# Patient Record
Sex: FEMALE | Race: BLACK OR AFRICAN AMERICAN | NOT HISPANIC OR LATINO | URBAN - METROPOLITAN AREA
[De-identification: names, ages, dates, MRNs, and addresses within clinical notes are randomized per-mention and may not be internally consistent; named-entity substitution may affect disease eponyms.]

---

## 2024-04-04 ENCOUNTER — EMERGENCY (EMERGENCY)
Age: 34
LOS: 1 days | Discharge: HOME | End: 2024-04-04
Admitting: EMERGENCY MEDICINE
Payer: MEDICAID

## 2024-04-04 VITALS
SYSTOLIC BLOOD PRESSURE: 153 MMHG | OXYGEN SATURATION: 99 % | HEART RATE: 73 BPM | DIASTOLIC BLOOD PRESSURE: 90 MMHG | RESPIRATION RATE: 16 BRPM | TEMPERATURE: 98 F

## 2024-04-04 VITALS
RESPIRATION RATE: 16 BRPM | HEART RATE: 84 BPM | WEIGHT: 139.99 LBS | DIASTOLIC BLOOD PRESSURE: 78 MMHG | OXYGEN SATURATION: 97 % | SYSTOLIC BLOOD PRESSURE: 133 MMHG | TEMPERATURE: 99 F

## 2024-04-04 PROCEDURE — 99284 EMERGENCY DEPT VISIT MOD MDM: CPT | Mod: 25

## 2024-04-04 PROCEDURE — 56420 I&D BARTHOLINS GLAND ABSCESS: CPT

## 2024-04-04 NOTE — ED STATDOCS - RAPID ASSESSMENT
32y/o F with pmhx of iron def anemia presents for 2 vaginal abscesses. No fevers, not currently pregnant per patient. I have performed a medical screening examination on this patient, no cardiac or respiratory findings. no acute distress. Will transfer to adult ED. Report given to Dr. Kapoor.   -Eliot Du PA-C

## 2024-04-04 NOTE — ED PROVIDER NOTE - NSFOLLOWUPINSTRUCTIONS_ED_ALL_ED_FT
Follow up with OB/GYN clinic in 1-2 weeks.  [phone]  Fax: [phone] [place] [address], [place] [address]    Return to Emergency room with any worsening symptoms or no improvements.

## 2024-04-04 NOTE — ED ADULT NURSE NOTE - NSFALLUNIVINTERV_ED_ALL_ED
Bed/Stretcher in lowest position, wheels locked, appropriate side rails in place/Call bell, personal items and telephone in reach/Instruct patient to call for assistance before getting out of bed/chair/stretcher/Non-slip footwear applied when patient is off stretcher/[place] to call system/Physically safe environment - no spills, clutter or unnecessary equipment/Purposeful proactive rounding/Room/bathroom lighting operational, light cord in reach

## 2024-04-04 NOTE — ED PROVIDER NOTE - PHYSICAL EXAMINATION
Exam chaperoned by Nurse sandy  there is bartholin abscess  around 3cm in right with fluctuance and tenderness.

## 2024-04-04 NOTE — ED PROVIDER NOTE - PRINCIPAL DIAGNOSIS
[facility] notified ETA 30-40min
Maintained one on one monitoring for pt. Remained coherent, when assessed for suicidal threats admitted to have active thoughts of hurting self.  Room been secured for safety issues
Parent (mother) @ bedside, claimed that pt needs her night meds for anti rejection for her transplant & anti anxiety. Dr Bernarda Lozoya informed will give prescribed meds. Prograf will be given from pt's stocks.
Patient moved at 2200 to locked seclusion room for her safety as she tried to strangle herself with a blood pressure cuff.
Pt lying on her stomach flat on stretcher in dark room with PCT in doorway. Pt awakens easily is calm and cooperative. Refuses breakfast at this time.
Pt was noted to have BP cord around her neck per Dr Dasia Lowe & was breathing but verbally non responsive.  Took cord out of pt neck & pt woke up right after per Dr Dasia Lowe
Report given to Xiam. Pt remained on one on one.
Report to [facility] @ [facility] [place] ext [phone]. Jemma Jamaal had questions regarding pt insulin pump , vitals and accucheck . Will return call with appropriate info.
Bartholin's gland abscess

## 2024-04-04 NOTE — ED PROVIDER NOTE - OBJECTIVE STATEMENT
32 yo female with no pmhx presents to the ED with vaginal abscess x1.5 weeks. pt states it started after she shaved the first time with a razor. she states it started at a small abscess and started to grow larger. she states it painful to walk due to the abscess. denies urinary sx, vag dc.   pt states she wishes to get a general STI testing.

## 2024-04-04 NOTE — ED ADULT TRIAGE NOTE - CHIEF COMPLAINT QUOTE
Pt endorsing abscess in the outer vaginal region for over a week. Pt states part of the abscess has drained, however it is still very big and causing blockage. Denying GI/ changes. Past medical history of iron deficiency anemia.

## 2024-04-04 NOTE — ED PROVIDER NOTE - PATIENT PORTAL LINK FT
You can access the FollowMyHealth Patient Portal offered by [facility] by registering at the following website: [email or web address]. By joining [facility]’s FollowMyHealth portal, you will also be able to view your health information using other applications (apps) compatible with our system.

## 2024-04-04 NOTE — ED ADULT NURSE NOTE - OBJECTIVE STATEMENT
33 year old AO4 ambulatory female c/o abscess/ cyst due to ingrown hair. PMHx of anemia. Pt states having white discharge. Labs/ urine collected. Pending GYN to evaluate.

## 2024-04-04 NOTE — ED PEDIATRIC TRIAGE NOTE - CHIEF COMPLAINT QUOTE
vaginal pain starting a few days ago. endorsing 2 abscesses in inner vaginal area, one of them "popped" & drained pus, other one increasing in size & becoming more painful. denies fevers. no meds pta. hx iron deficiency anemia, nkda.

## 2024-04-05 LAB
C TRACH RRNA SPEC QL NAA+PROBE: SIGNIFICANT CHANGE UP
HIV 1+2 AB+HIV1 P24 AG SERPL QL IA: SIGNIFICANT CHANGE UP
N GONORRHOEA RRNA SPEC QL NAA+PROBE: SIGNIFICANT CHANGE UP
T PALLIDUM AB TITR SER: NEGATIVE — SIGNIFICANT CHANGE UP

## 2024-04-05 RX ORDER — KETOROLAC TROMETHAMINE 30 MG/ML
30 SYRINGE (ML) INJECTION ONCE
Refills: 0 | Status: DISCONTINUED | OUTPATIENT
Start: 2024-04-05 | End: 2024-04-05

## 2024-04-05 RX ORDER — ACETAMINOPHEN 500 MG
1000 TABLET ORAL ONCE
Refills: 0 | Status: DISCONTINUED | OUTPATIENT
Start: 2024-04-05 | End: 2024-04-05

## 2024-04-05 NOTE — CONSULT NOTE ADULT - SUBJECTIVE AND OBJECTIVE BOX
GYN Consult Note    HPI:  33y  LMP 3/17 presents with vaginal discomfort on the right side for 2 weeks and progressive increasing size of her vaginal cyst. She has never experiences this before. Denies fevers, headaches, CP, SOB, abdominal pain, and edema. Reports that 2 weeks ago she shaved because she was unable to get a waxing appointment. Denies any new sexual partners    OB/GYN HISTORY:   G1: C/S emergent  G2: rC/S requiring blood transfusion     Last Menstrual Period: 3/17    Name of GYN Physician: Dr. Schmidt ([facility])  GYN: +h/o gonhorrea s/p treatment , denies abnormal paps. Denies fibroids and ovarian cysts     PAST MEDICAL & SURGICAL HISTORY:  Anemia  C/S x2  Thyroidectomy      Meds: Denies  All: NKDA  Shx: Denies toxic habits, anxiety and depression     REVIEW OF SYSTEMS  General: denies fevers, chills, tiredness  Skin/Breast: denies breast pain  Respiratory and Thorax: denies shortness of breath, denies cough  Cardiovascular: denies chest pain and denies palpitations  Gastrointestinal: denies abdominal pain, nausea/ vomiting	  Genitourinary: +vaginal pain denies dysuria, increased urinary frequency, urgency	  Constitutional, Cardiovascular, Respiratory, Gastrointestinal, Genitourinary, Musculoskeletal and Integumentary review of systems are normal except as noted. 	      Vital Signs Last 24 Hrs  T(C): 37 (2024 21:44), Max: 37 (2024 21:44)  T(F): 98.6 (2024 21:44), Max: 98.6 (2024 21:44)  HR: 84 (2024 21:44) (73 - 84)  BP: 133/78 (2024 21:44) (133/78 - 153/90)  BP(mean): --  RR: 16 (2024 21:44) (16 - 16)  SpO2: 97% (2024 21:44) (97% - 99%)    Parameters below as of 2024 21:44  Patient On (Oxygen Delivery Method): room air        PHYSICAL EXAM:   Gen: NAD, alert and oriented x 3  Cardiovascular: regular   Respiratory: breathing comfortably on RA  Abd: soft, non tender, non-distended  Pelvic: 2-3cm Bartholin tender to palpation, firm and tense  Extremities: NTBL  Skin: warm and well perfused

## 2024-04-05 NOTE — CONSULT NOTE ADULT - ASSESSMENT
33y  LMP 3/17 presents with vaginal discomfort on the right side for 2 weeks c/w 2-3cm Bartholin cyst s/p I&D.    - f/u w/ OBGYN or in clinic (information given) 1-2 weeks  - warm packs  - sitz baths  - strict return precautions counseled  - f/u culture    seen and d/w Dr. King Llanes PGY4 33y  LMP 3/17 presents with vaginal discomfort on the right side for 2 weeks c/w 2-3cm Bartholin cyst for I&D.    - f/u w/ OBGYN or in clinic (information given) 1-2 weeks  - warm packs  - sitz baths  - strict return precautions counseled  - f/u culture    seen and d/w Dr. King Llanes PGY4

## 2024-04-05 NOTE — PROCEDURE NOTE - SUPERVISORY STATEMENT
Attending Attestation:  I was present with resident during the performance of the I&D and was directly involved in the management of the patient. I discussed the case with the resident and agree with the findings and plan as documented in the resident’s note.  Kervin Malik M.D.

## 2024-04-07 LAB
-  AMPICILLIN/SULBACTAM: SIGNIFICANT CHANGE UP
-  CEFAZOLIN: SIGNIFICANT CHANGE UP
-  CLINDAMYCIN: SIGNIFICANT CHANGE UP
-  DAPTOMYCIN: SIGNIFICANT CHANGE UP
-  ERYTHROMYCIN: SIGNIFICANT CHANGE UP
-  GENTAMICIN: SIGNIFICANT CHANGE UP
-  LINEZOLID: SIGNIFICANT CHANGE UP
-  OXACILLIN: SIGNIFICANT CHANGE UP
-  PENICILLIN: SIGNIFICANT CHANGE UP
-  RIFAMPIN: SIGNIFICANT CHANGE UP
-  TETRACYCLINE: SIGNIFICANT CHANGE UP
-  TRIMETHOPRIM/SULFAMETHOXAZOLE: SIGNIFICANT CHANGE UP
-  VANCOMYCIN: SIGNIFICANT CHANGE UP
METHOD TYPE: SIGNIFICANT CHANGE UP

## 2024-04-07 NOTE — ED POST DISCHARGE NOTE - ADDITIONAL DOCUMENTATION
[facility] [employer] - Pt called twice regarding positive MRSA on wound culture but no answer and no voice messaging established - called [phone] line to follow up with call back tomorrow to assess if pt is on appropriate abx regimen for MRSA+ abscess.

## 2024-04-08 LAB
CULTURE RESULTS: ABNORMAL
ORGANISM # SPEC MICROSCOPIC CNT: ABNORMAL
ORGANISM # SPEC MICROSCOPIC CNT: ABNORMAL
SPECIMEN SOURCE: SIGNIFICANT CHANGE UP